# Patient Record
Sex: FEMALE | Race: WHITE | Employment: FULL TIME | URBAN - METROPOLITAN AREA
[De-identification: names, ages, dates, MRNs, and addresses within clinical notes are randomized per-mention and may not be internally consistent; named-entity substitution may affect disease eponyms.]

---

## 2019-04-11 ENCOUNTER — OFFICE VISIT (OUTPATIENT)
Dept: FAMILY MEDICINE | Facility: CLINIC | Age: 54
End: 2019-04-11
Payer: COMMERCIAL

## 2019-04-11 VITALS
BODY MASS INDEX: 22.5 KG/M2 | DIASTOLIC BLOOD PRESSURE: 68 MMHG | RESPIRATION RATE: 14 BRPM | WEIGHT: 127 LBS | HEART RATE: 67 BPM | TEMPERATURE: 98.5 F | HEIGHT: 63 IN | SYSTOLIC BLOOD PRESSURE: 104 MMHG | OXYGEN SATURATION: 98 %

## 2019-04-11 DIAGNOSIS — J40 BRONCHITIS: Primary | ICD-10-CM

## 2019-04-11 PROCEDURE — 99203 OFFICE O/P NEW LOW 30 MIN: CPT | Performed by: FAMILY MEDICINE

## 2019-04-11 RX ORDER — AZITHROMYCIN 250 MG/1
TABLET, FILM COATED ORAL
Qty: 6 TABLET | Refills: 0 | Status: SHIPPED | OUTPATIENT
Start: 2019-04-11 | End: 2019-04-16

## 2019-04-11 ASSESSMENT — MIFFLIN-ST. JEOR: SCORE: 1145.2

## 2019-04-11 NOTE — PROGRESS NOTES
"  SUBJECTIVE:   Kelby Rivers is a 53 year old female who presents to clinic today for the following   health issues:      RESPIRATORY SYMPTOMS      Duration: x 3 weeks cold sx , worse since last week     Description  nasal congestion, rhinorrhea, facial pain/pressure, sore throat ,chills feverish earlier not any more , now also has  cough becoming productive,  hoarse voice and post nasal drainage , SOB breathings feels  heavy, chest/neck tightness , hurts to cough     Severity: moderate    Accompanying signs and symptoms: None    History (predisposing factors):  none    Precipitating or alleviating factors: None    Therapies tried and outcome:  none      Additional history: as documented    Reviewed  and updated as needed this visit by clinical staff         Reviewed and updated as needed this visit by Provider         Patient Active Problem List   Diagnosis     Mandibular hypoplasia     Past Surgical History:   Procedure Laterality Date     LE FORT ONE , OSTEOTOMY SAGITTAL SPLIT, COMBINED  4/15/2014    Procedure: LE FORT ONE OSTEOTOMY TWO PIECE; AND SAGITTAL SPLIT OSTEOTOMY;  Surgeon: Trey Tobias DDS;  Location:  OR     SURGICAL PATHOLOGY EXAM         Social History     Tobacco Use     Smoking status: Never Smoker     Smokeless tobacco: Never Used   Substance Use Topics     Alcohol use: Not on file     No family history on file.        ROS:  Constitutional, HEENT, cardiovascular, pulmonary, GI, , musculoskeletal, neuro, skin, endocrine and psych systems are negative, except as otherwise noted.    OBJECTIVE:     /68   Pulse 67   Temp 98.5  F (36.9  C) (Tympanic)   Resp 14   Ht 1.6 m (5' 3\")   Wt 57.6 kg (127 lb)   SpO2 98%   BMI 22.50 kg/m    Body mass index is 22.5 kg/m .  GENERAL: healthy, alert and no distress  EYES: Eyes grossly normal to inspection, and conjunctivae and sclerae normal  HENT: ear canals and TM's normal and oral mucous membranes moist, Throat with mild pharyngeal " erythema, no sinus  tenderness  NECK: no adenopathy, but has mildly tender paracervical muscles   RESP: lungs clear to auscultation - no rales, rhonchi or wheezes  CV: regular rate and rhythm, normal S1 S2, no S3 or S4,          ASSESSMENT/PLAN:         (J40) Bronchitis  (primary encounter diagnosis)  Comment:   Plan: azithromycin (ZITHROMAX) 250 MG tablet                  URI lingering onto bronchitis. Treat with z pack.      Cares and symptomatic treatment discussed follow up if problem       Patient expressed understanding and agreement with treatment plan. All patient's questions were answered, will let me know if has more later.  Medications: Rx's: Reviewed the potential side effects/complications of medications prescribed.     Allegra Hilliard MD  [facility]

## 2019-04-12 ENCOUNTER — TELEPHONE (OUTPATIENT)
Dept: FAMILY MEDICINE | Facility: CLINIC | Age: 54
End: 2019-04-12

## 2019-04-12 DIAGNOSIS — J20.9 ACUTE BRONCHITIS, UNSPECIFIED ORGANISM: Primary | ICD-10-CM

## 2019-04-12 RX ORDER — ALBUTEROL SULFATE 90 UG/1
2 AEROSOL, METERED RESPIRATORY (INHALATION) EVERY 6 HOURS
Qty: 18 G | Refills: 0 | Status: SHIPPED | OUTPATIENT
Start: 2019-04-12

## 2019-04-12 NOTE — TELEPHONE ENCOUNTER
Reason for Call:  Other prescription    Detailed comments: pt is wanting an inhaler order because she has bronchitis. She was seen by Dr. Hilliard yesterday and prescribed a Z pack and she feels it isnt working. Pt demanded something be done today but I explained Dr. Hilliard is not in. Please call pt back. Thank you.    Phone Number Patient can be reached at: Home number on file [phone] (home)    Best Time: any    Can we leave a detailed message on this number? YES    Call taken on 4/12/2019 at 4:08 PM by Darlene Pak

## 2019-04-12 NOTE — TELEPHONE ENCOUNTER
Patient was called and stated that she was seen yesterday and has bronchitis. She states last time she had this she was given an inhaler as well and this time she wasn't. She would like to be prescribed an inhaler because she still feels elías tight and feels the inhaler helped her a lot last time. Informed her Dr. Hilliard is not in the office and we close soon but I will send it to the covering providers now. Patient stated an understanding and agreed with plan.      Routing to PCP for further review/recommendations/orders.    Pharmacy: [facility] pended.     Shruthi DEL VALLEN, RN   [facility]

## 2019-04-12 NOTE — TELEPHONE ENCOUNTER
Informed patient of note below from Ector Garcia PA-C:     Albuterol sent to pharmacy. She should use every 4-6 hours PRN for wheezing/tightness    Patient stated an understanding and agreed with plan.    Shruthi DEL VALLEN, RN   [facility]

## 2019-04-25 ENCOUNTER — TELEPHONE (OUTPATIENT)
Dept: FAMILY MEDICINE | Facility: CLINIC | Age: 54
End: 2019-04-25

## 2019-04-25 DIAGNOSIS — R05.9 COUGH: Primary | ICD-10-CM

## 2019-04-25 DIAGNOSIS — R49.0 HOARSENESS: ICD-10-CM

## 2019-04-25 NOTE — TELEPHONE ENCOUNTER
It is best to schedule a follow up visit here to [place] and reevaluate her sx, and based on that to decide who to refer

## 2019-04-25 NOTE — TELEPHONE ENCOUNTER
Left message for patient to call back to discuss     If it is mostly hoarseness and throat sx, and that;s what she thinks make her cough, then ENT is ok,   If she really has issue wit breathing wheezing etc with cough the may be pulmonology.  Although referral placed for ENT as per pt's request. Let me know if she needs anything different

## 2019-04-25 NOTE — TELEPHONE ENCOUNTER
Dr Hilliard patient was into see you 4/12 for bronchitis. She also was prescribed an inhaler. Patient states she is not getting any better and would like to see a specialist. She feels it's time to go to the next level. Patient was not for sure if she should see ENT or Pulmonary. If she is to see pulmonary she needs a referral before she can make an appt. Pending referral if not needs just cancel.Thanks    Azucena Lockett  Referral Coordinator

## 2019-04-25 NOTE — TELEPHONE ENCOUNTER
Spoke with patient regarding below. She does not want to come in for another visit. She only wants referral to specialist. I advised that we do not know who would be most appropriate. She states she has a dry consistent cough, pressure in neck, raspy voice. She thinks she should see ENT. States she is a little better with the antibiotic but still has above sxs. Advised her that she may have a lingering cough for 6 weeks with bronchitis after being treated. Please advise.   Aleyda Forman RN   [facility] - Triage

## 2019-04-26 NOTE — TELEPHONE ENCOUNTER
Triage this patient just called back and left message on my VM. Returning your call. Her numbers are [phone] or [phone].  Thanks   Azucena Lockett  Referral Coordinator

## 2019-04-26 NOTE — TELEPHONE ENCOUNTER
S/w pt and gave Dr. Hilliard's message.  Pt thinks it is a combination of both.  Gave pt both referral numbers to ENT:    Your provider has referred you to: [facility] [place] [phone]  [email or web address]  [facility]: [facility] [phone]   [email or web address] by [email or web address]    Pt states understanding.    Charisse YUAN RN  EP Triage